# Patient Record
Sex: FEMALE | Race: WHITE | NOT HISPANIC OR LATINO | Employment: OTHER | URBAN - METROPOLITAN AREA
[De-identification: names, ages, dates, MRNs, and addresses within clinical notes are randomized per-mention and may not be internally consistent; named-entity substitution may affect disease eponyms.]

---

## 2019-10-18 ENCOUNTER — HISTORICAL (OUTPATIENT)
Dept: ADMINISTRATIVE | Facility: HOSPITAL | Age: 69
End: 2019-10-18

## 2022-04-29 VITALS
WEIGHT: 229.94 LBS | BODY MASS INDEX: 32.92 KG/M2 | HEIGHT: 70 IN | SYSTOLIC BLOOD PRESSURE: 145 MMHG | OXYGEN SATURATION: 97 % | DIASTOLIC BLOOD PRESSURE: 76 MMHG

## 2022-05-02 NOTE — HISTORICAL [facility]ER
This is a historical note converted from [employer]. Formatting and pictures may have been removed.  Please reference [employer] for original formatting and attached multimedia. Chief Complaint  Rash around upper an lower lip with swelling to lower lip onset on and off X 2 months. Also has pain to lateral side of left ankle onset Saturday post tripping on tree root. Requesting flu shot  History of Present Illness  69-year-old female here with multiple concerns.  1.?About 2 months of?on and off rash with?edema?which is mainly lower lip.? No new meds, no new chapstick or face products.?  2.?About 6 days?ago tripped on a tree root?now with lateral ankle pain.?Able to ambulate.? Pain to L lateral ankle.  Also would like a flu shot today.  Review of Systems  Constitutional_negative for fever  ENMT_negative for rhinorrhea, sinus pressure, sore throat,?blurry vision or change in vision  Respiratory_negative for?cough, SOB  Gastrointestinal_negative for nausea or vomiting  Integumentary_per HPI  Physical Exam  Vitals & Measurements  T:?37? ?C (Oral)? HR:?67(Peripheral)? RR:?16? BP:?145/76? SpO2:?97%?  HT:?179?cm? WT:?104.3?kg? BMI:?32.55?  Gen: WD NAD  CV: S1S2 RRR no MRG  Resp: CTA B no RRW,? full excursions  Extr: Positive edema to the dorsum and lateral left ankle  MS:?Positive pain palpation over the lateral aspect of the left ankle, full range of motion of the ankle with minimal discomfort, no calf tenderness bilaterally, positive ecchymoses over the dorsal side of the left ankle, no Achilles?tendon?tenderness to palpation  Psych: Cooperative, approp mood and affect  Assessment/Plan  1.?Left lateral ankle pain?M25.572  ?X-ray of the left ankle done today, per my review negative for fracture.  Will also call with final report.  Ordered:  Office/Outpatient Visit Level 4 Established [id number] PC, Left lateral ankle pain  Perioral dermatitis  Flu vaccine need  [place], [facility], 10/18/19 11:40:00 CDT  XR Ankle Left Minimum  3 Views, Routine, 10/18/19 11:19:00 CDT, Follow Up Trauma, lateral ankle pain, None, Ambulatory, Rad Type, Left lateral ankle pain  Perioral dermatitis  Flu vaccine need, Not Scheduled, 10/18/19 11:19:00 CDT  ?  2.?Perioral dermatitis?L71.0  ?Steroid cream daily at nighttime,?cream like?CeraVe cream?in the morning.? Avoid chap stick.  Ordered:  hydrocortisone topical, 1 allison, TOP, Daily, apply in a thin film to the affected skin of the area around the mouth and rub in gently and completely, X 14 day(s), # 15 gm, 0 Refill(s), Pharmacy: [facility]pharmacy #[id number]  Office/Outpatient Visit Level 4 Established [id number] PC, Left lateral ankle pain  Perioral dermatitis  Flu vaccine need  Left ankle sprain, [facility], 10/18/19 11:40:00 CDT  XR Ankle Left Minimum 3 Views, Routine, 10/18/19 11:19:00 CDT, Follow Up Trauma, lateral ankle pain, None, Ambulatory, Rad Type, Left lateral ankle pain  Perioral dermatitis  Flu vaccine need, Not Scheduled, 10/18/19 11:19:00 CDT  ?  3.?Flu vaccine need?Z23  ?Flu vaccine given today.  Ordered:  Influenza Virus Vaccine, Inactivated, 0.5 mL, IM, Once-Unscheduled, first dose 10/18/19 11:19:00 CDT  Office/Outpatient Visit Level 4 Established [id number] PC, Left lateral ankle pain  Perioral dermatitis  Flu vaccine need  Left ankle sprain, [facility], 10/18/19 11:40:00 CDT  XR Ankle Left Minimum 3 Views, Routine, 10/18/19 11:19:00 CDT, Follow Up Trauma, lateral ankle pain, None, Ambulatory, Rad Type, Left lateral ankle pain  Perioral dermatitis  Flu vaccine need, Not Scheduled, 10/18/19 11:19:00 CDT  ?  4.?Left ankle sprain?S93.402A  ?ice, elevate, wrap, use as tolerated  Ordered:  Office/Outpatient Visit Level 4 Established [id number] PC, Left lateral ankle pain  Perioral dermatitis  Flu vaccine need  Left ankle sprain, [facility], 10/18/19 11:40:00 CDT  ?   Problem List/Past Medical History  Ongoing  HTN - Hypertension  Hyperlipidemia  Obesity  Historical  No qualifying data  Procedure/Surgical  History  Bladder lift (2017)  Cholecystectomy (2006)   Medications  Adult High-Dose Influenza Vaccine, 0.5 mL, IM, Once-Unscheduled  carvedilol 12.5 mg oral tablet  Cortizone-10 topical cream, 1 allison, TOP, Daily  hydrochlorothiazide-lisinopril 12.5 mg-20 mg oral tablet  rosuvastatin 20 mg oral tablet  Allergies  No Known Medication Allergies  Social History  Abuse/Neglect  No, 10/18/2019  Alcohol  Current, Wine, 1-2 times per month, 10/18/2019  Substance Use  Never, 10/18/2019  Tobacco  Former smoker, quit more than 30 days ago, Cigarettes, N/A, 36 Years (Age stopped)., 10/18/2019  Family History  Angina: Brother.  Breast cancer 27-APR-2016 23:30:55<$>: Mother.  COPD (chronic obstructive pulmonary disease) 27-APR-2016 23:37:36<$>: Father.  Diabetes 27-APR-2016 23:23:18<$>: Father.  HTN (hypertension) 27-APR-2016 23:35:49<$>: Father.  Osteoporosis: Mother.  Health Maintenance  Health Maintenance  ???Pending?(in the next year)  ??? ??OverDue  ??? ? ? ?Alcohol Misuse Screening due??01/01/19??and every 1??year(s)  ??? ? ? ?Cognitive Screening due??01/01/19??and every 1??year(s)  ??? ? ? ?Functional Assessment due??01/01/19??and every 1??year(s)  ??? ? ? ?Geriatric Depression Screening due??01/01/19??and every 1??year(s)  ??? ??Due?  ??? ? ? ?ADL Screening due??10/18/19??and every 1??year(s)  ??? ? ? ?Aspirin Therapy for CVD Prevention due??10/18/19??and every 1??year(s)  ??? ? ? ?Bone Density Screening due??10/18/19??Variable frequency  ??? ? ? ?Hypertension Management-Education due??10/18/19??and every 1??year(s)  ??? ? ? ?Pneumococcal Vaccine due??10/18/19??Variable frequency  ??? ? ? ?Tetanus Vaccine due??10/18/19??and every 10??year(s)  ??? ? ? ?Zoster Vaccine due??10/18/19??and every 100??year(s)  ??? ??Due In Future?  ??? ? ? ?Advance Directive not due until??01/01/20??and every 1??year(s)  ??? ? ? ?Fall Risk Assessment not due until??01/01/20??and every 1??year(s)  ??? ? ? ?Obesity Screening not due  until??01/01/20??and every 1??year(s)  ???Satisfied?(in the past 1 year)  ??? ??Satisfied?  ??? ? ? ?Advance Directive on??10/18/19.??Satisfied by Sage Gutierrez LPN  ??? ? ? ?Blood Pressure Screening on??10/18/19.??Satisfied by Sage Gutierrez LPN  ??? ? ? ?Body Mass Index Check on??10/18/19.??Satisfied by Sage Gutierrez LPN  ??? ? ? ?Fall Risk Assessment on??10/18/19.??Satisfied by Sage Gutierrez LPN  ??? ? ? ?Hypertension Management-Blood Pressure on??10/18/19.??Satisfied by Sage Gutierrez LPN  ??? ? ? ?Influenza Vaccine on??10/18/19.??Satisfied by Anne Mckee MD  ??? ? ? ?Obesity Screening on??10/18/19.??Satisfied by Sage Gutierrez LPN  ?

## 2022-10-10 ENCOUNTER — PROCEDURE VISIT (OUTPATIENT)
Dept: RESPIRATORY THERAPY | Facility: HOSPITAL | Age: 72
End: 2022-10-10
Attending: INTERNAL MEDICINE
Payer: MEDICARE

## 2022-10-10 DIAGNOSIS — R91.8 PULMONARY NODULES: ICD-10-CM

## 2022-10-10 PROCEDURE — 94010 BREATHING CAPACITY TEST: CPT

## 2022-10-10 PROCEDURE — 94729 DIFFUSING CAPACITY: CPT

## 2022-10-10 PROCEDURE — 94727 GAS DIL/WSHOT DETER LNG VOL: CPT

## 2023-02-24 ENCOUNTER — OFFICE VISIT (OUTPATIENT)
Dept: URGENT CARE | Facility: CLINIC | Age: 73
End: 2023-02-24
Payer: MEDICARE

## 2023-02-24 VITALS
TEMPERATURE: 99 F | RESPIRATION RATE: 20 BRPM | WEIGHT: 230 LBS | HEIGHT: 70 IN | DIASTOLIC BLOOD PRESSURE: 80 MMHG | BODY MASS INDEX: 32.93 KG/M2 | OXYGEN SATURATION: 95 % | SYSTOLIC BLOOD PRESSURE: 130 MMHG | HEART RATE: 65 BPM

## 2023-02-24 DIAGNOSIS — J02.9 SORE THROAT: ICD-10-CM

## 2023-02-24 DIAGNOSIS — T78.40XA MINOR ALLERGIC REACTION, INITIAL ENCOUNTER: Primary | ICD-10-CM

## 2023-02-24 DIAGNOSIS — J30.2 SEASONAL ALLERGIES: ICD-10-CM

## 2023-02-24 LAB
CTP QC/QA: YES
MOLECULAR STREP A: NEGATIVE

## 2023-02-24 PROCEDURE — 99213 OFFICE O/P EST LOW 20 MIN: CPT | Mod: ,,, | Performed by: FAMILY MEDICINE

## 2023-02-24 PROCEDURE — 87651 STREP A DNA AMP PROBE: CPT | Mod: QW,,, | Performed by: FAMILY MEDICINE

## 2023-02-24 PROCEDURE — 99213 PR OFFICE/OUTPT VISIT, EST, LEVL III, 20-29 MIN: ICD-10-PCS | Mod: ,,, | Performed by: FAMILY MEDICINE

## 2023-02-24 PROCEDURE — 87651 POCT STREP A MOLECULAR: ICD-10-PCS | Mod: QW,,, | Performed by: FAMILY MEDICINE

## 2023-02-24 RX ORDER — POTASSIUM CHLORIDE 750 MG/1
10 CAPSULE, EXTENDED RELEASE ORAL ONCE
COMMUNITY

## 2023-02-24 RX ORDER — OLOPATADINE HYDROCHLORIDE 1 MG/ML
1 SOLUTION/ DROPS OPHTHALMIC 2 TIMES DAILY
Qty: 5 ML | Refills: 1 | Status: SHIPPED | OUTPATIENT
Start: 2023-02-24 | End: 2023-06-01 | Stop reason: ALTCHOICE

## 2023-02-24 RX ORDER — MONTELUKAST SODIUM 10 MG/1
10 TABLET ORAL NIGHTLY
Qty: 30 TABLET | Refills: 0 | Status: SHIPPED | OUTPATIENT
Start: 2023-02-24 | End: 2023-03-26

## 2023-02-24 RX ORDER — PREDNISONE 10 MG/1
10 TABLET ORAL DAILY
Qty: 3 TABLET | Refills: 0 | Status: SHIPPED | OUTPATIENT
Start: 2023-02-24 | End: 2023-02-27

## 2023-02-24 NOTE — PROGRESS NOTES
"Subjective:       Patient ID: Colleen Reyes is a 72 y.o. female.    Vitals:  height is 5' 10" (1.778 m) and weight is 104.3 kg (230 lb). Her temperature is 98.8 °F (37.1 °C). Her blood pressure is 130/80 and her pulse is 65. Her respiration is 20 and oxygen saturation is 95%.     Chief Complaint: congestion of sinuses (Earache, throat feels swollen,  doesn't hurt; sinuses draining, palate feels swollen)    5 days of periorbital swelling, general throat itching and swelling sensation.  No dysphasia, no wheeze, no NVD.  Exposed to strep.        Constitution: Negative for chills, fatigue and fever.   HENT:  Positive for postnasal drip. Negative for congestion, sinus pressure and trouble swallowing.    Neck: Negative for neck pain and neck stiffness.   Cardiovascular:  Negative for chest pain, leg swelling and sob on exertion.   Respiratory:  Positive for cough. Negative for chest tightness, shortness of breath and wheezing.    Neurological:  Negative for dizziness, disorientation and altered mental status.   Psychiatric/Behavioral:  Negative for altered mental status and disorientation.      Objective:      Physical Exam   Constitutional: She is oriented to person, place, and time. She appears well-developed. No distress.   HENT:   Head: Normocephalic.   Ears:   Right Ear: Tympanic membrane and external ear normal.   Left Ear: Tympanic membrane and external ear normal.   Nose: Rhinorrhea present.   Mouth/Throat: Uvula is midline and mucous membranes are normal. No uvula swelling. Cobblestoning present. No oropharyngeal exudate or posterior oropharyngeal edema. Tonsils are 0 on the right. Tonsils are 0 on the left. No tonsillar exudate.   Eyes: Pupils are equal, round, and reactive to light. Right eye exhibits no discharge. Left eye exhibits no discharge.   Neck: Neck supple. No tracheal deviation present.   Cardiovascular: Normal rate, regular rhythm and normal heart sounds.   No murmur heard.  Pulmonary/Chest: Effort " normal and breath sounds normal. No stridor. No respiratory distress. She has no wheezes.   Lymphadenopathy:     She has no cervical adenopathy.   Neurological: She is alert and oriented to person, place, and time.   Skin: Skin is warm and dry.   Psychiatric: Thought content normal.   Nursing note and vitals reviewed.      Assessment:       1. Minor allergic reaction, initial encounter    2. Sore throat    3. Seasonal allergies            Plan:         Minor allergic reaction, initial encounter    Sore throat  -     POCT Strep A, Molecular    Seasonal allergies  -     montelukast (SINGULAIR) 10 mg tablet; Take 1 tablet (10 mg total) by mouth every evening.  Dispense: 30 tablet; Refill: 0  -     predniSONE (DELTASONE) 10 MG tablet; Take 1 tablet (10 mg total) by mouth once daily. for 3 days  Dispense: 3 tablet; Refill: 0  -     olopatadine (PATANOL) 0.1 % ophthalmic solution; Place 1 drop into both eyes 2 (two) times daily.  Dispense: 5 mL; Refill: 1            Strep test negative.

## 2023-06-01 ENCOUNTER — OFFICE VISIT (OUTPATIENT)
Dept: URGENT CARE | Facility: CLINIC | Age: 73
End: 2023-06-01
Payer: MEDICARE

## 2023-06-01 VITALS
TEMPERATURE: 98 F | SYSTOLIC BLOOD PRESSURE: 153 MMHG | BODY MASS INDEX: 34.36 KG/M2 | DIASTOLIC BLOOD PRESSURE: 78 MMHG | HEART RATE: 76 BPM | RESPIRATION RATE: 18 BRPM | OXYGEN SATURATION: 97 % | WEIGHT: 240 LBS | HEIGHT: 70 IN

## 2023-06-01 DIAGNOSIS — J20.9 BRONCHOSPASM WITH BRONCHITIS, ACUTE: Primary | ICD-10-CM

## 2023-06-01 DIAGNOSIS — R05.9 COUGH, UNSPECIFIED TYPE: ICD-10-CM

## 2023-06-01 PROCEDURE — 96372 THER/PROPH/DIAG INJ SC/IM: CPT | Mod: ,,, | Performed by: FAMILY MEDICINE

## 2023-06-01 PROCEDURE — 99213 PR OFFICE/OUTPT VISIT, EST, LEVL III, 20-29 MIN: ICD-10-PCS | Mod: 25,,, | Performed by: FAMILY MEDICINE

## 2023-06-01 PROCEDURE — 96372 PR INJECTION,THERAP/PROPH/DIAG2ST, IM OR SUBCUT: ICD-10-PCS | Mod: ,,, | Performed by: FAMILY MEDICINE

## 2023-06-01 PROCEDURE — 99213 OFFICE O/P EST LOW 20 MIN: CPT | Mod: 25,,, | Performed by: FAMILY MEDICINE

## 2023-06-01 RX ORDER — DOXYCYCLINE 100 MG/1
100 CAPSULE ORAL EVERY 12 HOURS
Qty: 10 CAPSULE | Refills: 0 | Status: SHIPPED | OUTPATIENT
Start: 2023-06-01 | End: 2023-06-06

## 2023-06-01 RX ORDER — ALBUTEROL SULFATE 90 UG/1
2 AEROSOL, METERED RESPIRATORY (INHALATION) EVERY 6 HOURS PRN
Qty: 8 G | Refills: 0 | Status: SHIPPED | OUTPATIENT
Start: 2023-06-01

## 2023-06-01 RX ORDER — DEXAMETHASONE SODIUM PHOSPHATE 100 MG/10ML
5 INJECTION INTRAMUSCULAR; INTRAVENOUS ONCE
Status: COMPLETED | OUTPATIENT
Start: 2023-06-01 | End: 2023-06-01

## 2023-06-01 RX ADMIN — DEXAMETHASONE SODIUM PHOSPHATE 5 MG: 100 INJECTION INTRAMUSCULAR; INTRAVENOUS at 09:06

## 2023-06-01 NOTE — PATIENT INSTRUCTIONS
Cool mist vaporizer and cough drop to keep there was moist.  Considering the duration of symptoms and worsening symptoms chest x-ray today.  Radiology final results will be monitored and reported.  Reviewed the x-rays as well with the patient.  Voiced understanding.  Coricidin HBP cough and cold.  Decadron IM today risk and benefits discussed voiced understanding.  Doxycycline with food and milk to avoid gastric symptoms.  Monitor the symptoms.  Call or return to clinic for any questions.  Voiced understanding.  Adequate hydration and rest

## 2023-06-01 NOTE — PROGRESS NOTES
"Subjective:      Patient ID: Colleen Reyes is a 73 y.o. female.    Vitals:  height is 5' 10" (1.778 m) and weight is 108.9 kg (240 lb). Her temperature is 98.2 °F (36.8 °C). Her blood pressure is 153/78 (abnormal) and her pulse is 76. Her respiration is 18 and oxygen saturation is 97%.     Chief Complaint: Cough (X 1 week- cough, chest congestion , cough making chest hurt - rt lower side , sinus drip , had steroid injection in knee last week )    HPI:  73-year-old female known for multiple medical condition present to clinic with concerns of coughing, chest congestion, thick green mucus since 1 week.  Symptoms gradual in onset and worsening.  Over-the-counter medication not much help.  The states right lower side of the ribs hurt from coughing.  Increases with increased breathing and coughing.  No alleviating factors.  No measured fever at home.  Reviewed the vital signs blood pressure slightly elevated in the clinic today.  History of smoking tobacco in the past.  No history of asthma.  Requesting for cortisone shot as it helped in the past.  Understands the risk and benefits.    ROS :  Constitutional : _No fatigue, No Fever  HEENT : _No sore throat, positive for nasal congestion, sinus congestion and postnasal drip  Neck : No pain, range of motion present  Respiratory : _Coughing, mucus production  Cardiovascular : _No chest pain, no palpitations  Gastrointestinal : _No vomiting or diarrhea. No abdominal pain  Integumentary : _No skin rash or abnormal lesion  Neurologic_No headache, No dizziness   Objective:     Physical Exam  General : Alert and Oriented, No apparent distress, afebrile, sounds stuffy congested and spastic bronchial coughing  Neck - supple  HENT : Oropharynx no redness or swelling.  Bilateral TMs intact mild fluid no redness.   Respiratory : Bilateral equal breath sounds, nonlabored respirations  Cardiovascular : Rate, rhythm regular, normal volume pulse, no murmur  Gastrointestinal: Full abdomen, " soft, nontender to palpate  Integumentary : Warm, Dry and no rash    Assessment:     1. Bronchospasm with bronchitis, acute    2. Cough, unspecified type      Plan:   Cool mist vaporizer and cough drop to keep there was moist.  Considering the duration of symptoms and worsening symptoms chest x-ray today.  Radiology final results will be monitored and reported.  Reviewed the x-rays as well with the patient.  Voiced understanding.  Coricidin HBP cough and cold.  Decadron IM today risk and benefits discussed voiced understanding.  Doxycycline with food and milk to avoid gastric symptoms.  Monitor the symptoms.  Call or return to clinic for any questions.  Voiced understanding.  Adequate hydration and rest    Bronchospasm with bronchitis, acute  -     dexAMETHasone injection 5 mg  -     doxycycline (MONODOX) 100 MG capsule; Take 1 capsule (100 mg total) by mouth every 12 (twelve) hours. for 5 days  Dispense: 10 capsule; Refill: 0  -     albuterol (VENTOLIN HFA) 90 mcg/actuation inhaler; Inhale 2 puffs into the lungs every 6 (six) hours as needed for Wheezing. Rescue  Dispense: 8 g; Refill: 0    Cough, unspecified type  -     XR CHEST PA AND LATERAL; Future; Expected date: 06/01/2023

## 2023-11-20 ENCOUNTER — OFFICE VISIT (OUTPATIENT)
Dept: URGENT CARE | Facility: CLINIC | Age: 73
End: 2023-11-20
Payer: MEDICARE

## 2023-11-20 VITALS
TEMPERATURE: 98 F | HEART RATE: 63 BPM | SYSTOLIC BLOOD PRESSURE: 163 MMHG | WEIGHT: 234 LBS | OXYGEN SATURATION: 97 % | HEIGHT: 70 IN | BODY MASS INDEX: 33.5 KG/M2 | DIASTOLIC BLOOD PRESSURE: 97 MMHG | RESPIRATION RATE: 17 BRPM

## 2023-11-20 DIAGNOSIS — J02.9 SORE THROAT: ICD-10-CM

## 2023-11-20 DIAGNOSIS — J01.90 ACUTE RHINOSINUSITIS: Primary | ICD-10-CM

## 2023-11-20 LAB
CTP QC/QA: YES
MOLECULAR STREP A: NEGATIVE
POC MOLECULAR INFLUENZA A AGN: NEGATIVE
POC MOLECULAR INFLUENZA B AGN: NEGATIVE
SARS-COV-2 RDRP RESP QL NAA+PROBE: NEGATIVE

## 2023-11-20 PROCEDURE — 87651 POCT STREP A MOLECULAR: ICD-10-PCS | Mod: QW,,, | Performed by: FAMILY MEDICINE

## 2023-11-20 PROCEDURE — 87651 STREP A DNA AMP PROBE: CPT | Mod: QW,,, | Performed by: FAMILY MEDICINE

## 2023-11-20 PROCEDURE — 99213 PR OFFICE/OUTPT VISIT, EST, LEVL III, 20-29 MIN: ICD-10-PCS | Mod: 25,,, | Performed by: FAMILY MEDICINE

## 2023-11-20 PROCEDURE — 99213 OFFICE O/P EST LOW 20 MIN: CPT | Mod: 25,,, | Performed by: FAMILY MEDICINE

## 2023-11-20 PROCEDURE — 87635: ICD-10-PCS | Mod: QW,,, | Performed by: FAMILY MEDICINE

## 2023-11-20 PROCEDURE — 87502 POCT INFLUENZA A/B MOLECULAR: ICD-10-PCS | Mod: QW,,, | Performed by: FAMILY MEDICINE

## 2023-11-20 PROCEDURE — 87502 INFLUENZA DNA AMP PROBE: CPT | Mod: QW,,, | Performed by: FAMILY MEDICINE

## 2023-11-20 PROCEDURE — 96372 PR INJECTION,THERAP/PROPH/DIAG2ST, IM OR SUBCUT: ICD-10-PCS | Mod: ,,, | Performed by: FAMILY MEDICINE

## 2023-11-20 PROCEDURE — 96372 THER/PROPH/DIAG INJ SC/IM: CPT | Mod: ,,, | Performed by: FAMILY MEDICINE

## 2023-11-20 PROCEDURE — 87635 SARS-COV-2 COVID-19 AMP PRB: CPT | Mod: QW,,, | Performed by: FAMILY MEDICINE

## 2023-11-20 RX ORDER — DEXAMETHASONE SODIUM PHOSPHATE 100 MG/10ML
8 INJECTION INTRAMUSCULAR; INTRAVENOUS ONCE
Status: COMPLETED | OUTPATIENT
Start: 2023-11-20 | End: 2023-11-20

## 2023-11-20 RX ADMIN — DEXAMETHASONE SODIUM PHOSPHATE 8 MG: 100 INJECTION INTRAMUSCULAR; INTRAVENOUS at 10:11

## 2023-11-20 NOTE — PATIENT INSTRUCTIONS
Discussed the physical finding , Condition and course  Adequate hydration.  Cool mist vaporizer to keep the airways moist  Tylenol and ibuprofen for fever, body aches and sore throat.   Warm saltwater gargles for sore throat.   Claritin 10 mg or Zyrtec 10 mg for nasal congestion  Coricidin HBP for cough and cold as needed and as directed  Decadron IM today risk and benefits discussed voiced understanding  Flu test negative, strep test negative, COVID-19 test negative  Call or return to clinic for any questions    Blood pressure elevated in the clinic, monitor the blood pressure and maintain the log.  Caution with over-the-counter cough and cold medications.  Voiced understanding

## 2023-11-20 NOTE — PROGRESS NOTES
"Subjective:      Patient ID: Colleen Reyes is a 73 y.o. female.    Vitals:  height is 5' 10" (1.778 m) and weight is 106.1 kg (234 lb). Her temperature is 98 °F (36.7 °C). Her blood pressure is 163/97 (abnormal) and her pulse is 63. Her respiration is 17 and oxygen saturation is 97%.     Chief Complaint: Sinus Problem (Sinus pressure, sinus congestion, runny nose, watery eyes, cough, sinus , sore throat x Saturday )    HPI:  73-year-old female known for multiple chronic medical condition follows up with primary MD.  Present to clinic with 2 days' history of nasal congestion, sinus congestion, sinus pressure, postnasal drip and coughing.  No measured fever at home.  Reviewed the vital signs, blood pressure elevated.  History of hypertension, currently on medications.  No concerns of positive exposure to infections.  Patient is vaccinated for COVID-19.  Severity of symptoms 7 on 10.  Requesting for cortisone injection as it helped in the past.  Understands the risks and benefits.    ROS :  Constitutional : _ No fever , Body aches, Chills  Eyes : _No redness, drainage or pain  HENT_sore throat, postnasal drainage  Respiratory_no wheezing, no shortness of breath  Cardiovascular_no chest pain  Gastrointestinal_ No vomiting, No diarrhea, No abdominal pain  Musculoskeletal_no joint pain, no joint swelling  Integumentary_no skin rash     Objective:     Physical Exam  General : Alert and Oriented, No apparent distress, afebrile, sounds stuffy congested  Neck - supple  HENT : Oropharynx no redness or swelling.  Bilateral TMs intact mild fluid no redness.   Respiratory : Bilateral equal breath sounds, nonlabored respirations  Cardiovascular : Rate, rhythm regular, normal volume pulse, no murmur  Gastrointestinal: Full abdomen, soft, nontender to palpate  Integumentary : Warm, Dry and no rash    Assessment:     1. Acute rhinosinusitis    2. Sore throat      Plan:   Discussed the physical finding , Condition and course  Adequate " hydration.  Cool mist vaporizer to keep the airways moist  Tylenol and ibuprofen for fever, body aches and sore throat.   Warm saltwater gargles for sore throat.   Claritin 10 mg or Zyrtec 10 mg for nasal congestion  Coricidin HBP for cough and cold as needed and as directed  Decadron IM today risk and benefits discussed voiced understanding  Flu test negative, strep test negative, COVID-19 test negative  Call or return to clinic for any questions    Blood pressure elevated in the clinic, monitor the blood pressure and maintain the log.  Caution with over-the-counter cough and cold medications.  Voiced understanding    Acute rhinosinusitis  -     dexAMETHasone injection 8 mg    Sore throat  -     POCT COVID-19 Rapid Screening  -     POCT Strep A, Molecular  -     POCT Influenza A/B MOLECULAR

## 2024-05-09 ENCOUNTER — OFFICE VISIT (OUTPATIENT)
Dept: URGENT CARE | Facility: CLINIC | Age: 74
End: 2024-05-09
Payer: MEDICARE

## 2024-05-09 VITALS
TEMPERATURE: 99 F | SYSTOLIC BLOOD PRESSURE: 144 MMHG | HEART RATE: 62 BPM | RESPIRATION RATE: 18 BRPM | DIASTOLIC BLOOD PRESSURE: 79 MMHG | BODY MASS INDEX: 34.36 KG/M2 | HEIGHT: 70 IN | WEIGHT: 240 LBS | OXYGEN SATURATION: 96 %

## 2024-05-09 DIAGNOSIS — J32.9 SINUSITIS, UNSPECIFIED CHRONICITY, UNSPECIFIED LOCATION: Primary | ICD-10-CM

## 2024-05-09 PROCEDURE — 99213 OFFICE O/P EST LOW 20 MIN: CPT | Mod: 25,,, | Performed by: FAMILY MEDICINE

## 2024-05-09 PROCEDURE — 96372 THER/PROPH/DIAG INJ SC/IM: CPT | Mod: ,,, | Performed by: FAMILY MEDICINE

## 2024-05-09 RX ORDER — PROMETHAZINE HYDROCHLORIDE AND DEXTROMETHORPHAN HYDROBROMIDE 6.25; 15 MG/5ML; MG/5ML
5 SYRUP ORAL EVERY 6 HOURS PRN
Qty: 120 ML | Refills: 0 | Status: SHIPPED | OUTPATIENT
Start: 2024-05-09 | End: 2024-05-15

## 2024-05-09 RX ORDER — PREDNISONE 10 MG/1
30 TABLET ORAL DAILY
Qty: 9 TABLET | Refills: 0 | Status: SHIPPED | OUTPATIENT
Start: 2024-05-09 | End: 2024-05-12

## 2024-05-09 RX ORDER — AMLODIPINE BESYLATE 10 MG/1
10 TABLET ORAL
COMMUNITY
Start: 2024-02-01

## 2024-05-09 RX ORDER — DEXAMETHASONE SODIUM PHOSPHATE 100 MG/10ML
10 INJECTION INTRAMUSCULAR; INTRAVENOUS
Status: COMPLETED | OUTPATIENT
Start: 2024-05-09 | End: 2024-05-09

## 2024-05-09 RX ORDER — HYDROCODONE BITARTRATE AND ACETAMINOPHEN 10; 325 MG/1; MG/1
1 TABLET ORAL
COMMUNITY
Start: 2024-01-09

## 2024-05-09 RX ORDER — AMOXICILLIN AND CLAVULANATE POTASSIUM 875; 125 MG/1; MG/1
1 TABLET, FILM COATED ORAL EVERY 12 HOURS
Qty: 14 TABLET | Refills: 0 | Status: SHIPPED | OUTPATIENT
Start: 2024-05-09 | End: 2024-05-16

## 2024-05-09 RX ORDER — NAPROXEN SODIUM 220 MG/1
81 TABLET, FILM COATED ORAL DAILY
COMMUNITY

## 2024-05-09 RX ORDER — LISINOPRIL AND HYDROCHLOROTHIAZIDE 12.5; 2 MG/1; MG/1
TABLET ORAL
COMMUNITY
Start: 2022-03-17

## 2024-05-09 RX ADMIN — DEXAMETHASONE SODIUM PHOSPHATE 10 MG: 100 INJECTION INTRAMUSCULAR; INTRAVENOUS at 09:05

## 2024-05-09 NOTE — PROGRESS NOTES
"Subjective:      Patient ID: Colleen Reyes is a 74 y.o. female.    Vitals:  height is 5' 10" (1.778 m) and weight is 108.9 kg (240 lb). Her oral temperature is 98.6 °F (37 °C). Her blood pressure is 144/79 (abnormal) and her pulse is 62. Her respiration is 18 and oxygen saturation is 96%.     Chief Complaint: Cough     Patient is a 74 y.o. female who presents to urgent care with complaints of nasal congestion, sinus pressure, ear drainage,cough, and sinus HA x 4 days. Alleviating factors include allegra and Flonase OTC with mild amount of relief. Patient denies fever or sore throat.      Constitution: Negative.   HENT:  Positive for congestion and sinus pressure.    Cardiovascular: Negative.    Eyes: Negative.    Respiratory:  Positive for cough.    Gastrointestinal: Negative.    Genitourinary: Negative.    Musculoskeletal: Negative.    Skin: Negative.    Allergic/Immunologic: Negative.    Neurological: Negative.    Hematologic/Lymphatic: Negative.       Objective:     Physical Exam   Constitutional: She is oriented to person, place, and time. She appears well-developed. She is cooperative.  Non-toxic appearance. She does not appear ill. No distress.   HENT:   Head: Normocephalic and atraumatic.   Ears:   Right Ear: Hearing and external ear normal.   Left Ear: Hearing and external ear normal. no impacted cerumen (dullness of the bilateral TMs)  Mouth/Throat: Mucous membranes are normal. Posterior oropharyngeal erythema (postnasal drip) present. No oropharyngeal exudate.   Eyes: Conjunctivae and lids are normal.   Neck: Trachea normal and phonation normal. Neck supple. No edema present. No erythema present. No neck rigidity present.   Cardiovascular: Normal rate.   Pulmonary/Chest: Effort normal and breath sounds normal. No stridor. No respiratory distress. She has no decreased breath sounds. She has no wheezes. She has no rhonchi. She has no rales.   Abdominal: Normal appearance.   Neurological: She is alert and " oriented to person, place, and time. She exhibits normal muscle tone. Coordination normal.   Skin: Skin is warm, dry, intact, not diaphoretic and no rash.   Psychiatric: Her speech is normal and behavior is normal. Mood, judgment and thought content normal.   Nursing note and vitals reviewed.         Previous History      Review of patient's allergies indicates:  No Known Allergies    Past Medical History:   Diagnosis Date    Hyperlipidemia     Hypertension     Rheumatoid arthritis, unspecified      Current Outpatient Medications   Medication Instructions    albuterol (VENTOLIN HFA) 90 mcg/actuation inhaler 2 puffs, Inhalation, Every 6 hours PRN, Rescue    amLODIPine (NORVASC) 10 mg    amoxicillin-clavulanate 875-125mg (AUGMENTIN) 875-125 mg per tablet 1 tablet, Oral, Every 12 hours    aspirin 81 mg, Oral, Daily    atorvastatin (LIPITOR) 40 mg, Oral, Nightly    carvediloL (COREG) 25 mg, Oral, 2 times daily    furosemide (LASIX) 20 mg, Daily PRN    HYDROcodone-acetaminophen (NORCO)  mg per tablet 1 tablet    lisinopriL (PRINIVIL,ZESTRIL) 20 mg, Oral    lisinopriL-hydrochlorothiazide (PRINZIDE,ZESTORETIC) 20-12.5 mg per tablet Oral    meloxicam (MOBIC) 15 mg, Oral, Daily PRN    potassium chloride (MICRO-K) 10 MEQ CpSR 10 mEq, Once    predniSONE (DELTASONE) 30 mg, Oral, Daily    promethazine-dextromethorphan (PROMETHAZINE-DM) 6.25-15 mg/5 mL Syrp 5 mLs, Oral, Every 6 hours PRN     Past Surgical History:   Procedure Laterality Date    CARDIAC CATHETERIZATION      CHOLECYSTECTOMY      knee scope       Family History   Problem Relation Name Age of Onset    Mental illness Mother      Cancer Mother      Cancer Father      Diabetes Father      Heart disease Father      Hypertension Father      No Known Problems Sister      No Known Problems Brother         Social History     Tobacco Use    Smoking status: Former     Current packs/day: 0.00     Average packs/day: 1 pack/day for 18.0 years (18.0 ttl pk-yrs)     Types:  "Cigarettes     Start date:      Quit date:      Years since quittin.3    Smokeless tobacco: Never   Substance Use Topics    Alcohol use: Never    Drug use: Never        Physical Exam      Vital Signs Reviewed   BP (!) 144/79 (BP Location: Left arm)   Pulse 62   Temp 98.6 °F (37 °C) (Oral)   Resp 18   Ht 5' 10" (1.778 m)   Wt 108.9 kg (240 lb)   SpO2 96%   BMI 34.44 kg/m²        Procedures    Procedures     Labs     Results for orders placed or performed in visit on 23   POCT COVID-19 Rapid Screening   Result Value Ref Range    POC Rapid COVID Negative Negative     Acceptable Yes    POCT Strep A, Molecular   Result Value Ref Range    Molecular Strep A, POC Negative Negative     Acceptable Yes    POCT Influenza A/B MOLECULAR   Result Value Ref Range    POC Molecular Influenza A Ag Negative Negative, Not Reported    POC Molecular Influenza B Ag Negative Negative, Not Reported     Acceptable Yes        Assessment:     1. Sinusitis, unspecified chronicity, unspecified location        Plan:   Medications sent to pharmacy  Start antibiotics today  Start oral steroids tomorrow  Cough syrup may cause drowsiness.  Do not drink alcohol or drive when taking it  Start taking an allergy pill daily such as claritin, zyrtec, allegrea or xyzal. Also start using a nasal steroid spray such as flonase or nasacort daily. If you are not being treated for high blood pressure, you can also take decongestant such as sudafed as needed. They can be purchased over the counter. If oral steroids were prescribed, start them tomorrow morning. Monitor for fever. Take tylenol/acetaminophen or ibuprofen as needed. Rest and hydrate. If symptoms persist or worsen, return to clinic or seek medical attention immediately.       Sinusitis, unspecified chronicity, unspecified location    Other orders  -     dexAMETHasone injection 10 mg  -     predniSONE (DELTASONE) 10 MG tablet; Take 3 " tablets (30 mg total) by mouth once daily. for 3 days  Dispense: 9 tablet; Refill: 0  -     amoxicillin-clavulanate 875-125mg (AUGMENTIN) 875-125 mg per tablet; Take 1 tablet by mouth every 12 (twelve) hours. for 7 days  Dispense: 14 tablet; Refill: 0  -     promethazine-dextromethorphan (PROMETHAZINE-DM) 6.25-15 mg/5 mL Syrp; Take 5 mLs by mouth every 6 (six) hours as needed (cough).  Dispense: 120 mL; Refill: 0

## 2024-05-09 NOTE — PATIENT INSTRUCTIONS
Plan:   Medications sent to pharmacy  Start antibiotics today  Start oral steroids tomorrow  Cough syrup may cause drowsiness.  Do not drink alcohol or drive when taking it  Start taking an allergy pill daily such as claritin, zyrtec, allegrea or xyzal. Also start using a nasal steroid spray such as flonase or nasacort daily. If you are not being treated for high blood pressure, you can also take decongestant such as sudafed as needed. They can be purchased over the counter. If oral steroids were prescribed, start them tomorrow morning. Monitor for fever. Take tylenol/acetaminophen or ibuprofen as needed. Rest and hydrate. If symptoms persist or worsen, return to clinic or seek medical attention immediately.